# Patient Record
Sex: MALE | Race: WHITE
[De-identification: names, ages, dates, MRNs, and addresses within clinical notes are randomized per-mention and may not be internally consistent; named-entity substitution may affect disease eponyms.]

---

## 2018-03-26 ENCOUNTER — HOSPITAL ENCOUNTER (EMERGENCY)
Dept: HOSPITAL 92 - ERS | Age: 38
Discharge: HOME | End: 2018-03-26
Payer: OTHER GOVERNMENT

## 2018-03-26 DIAGNOSIS — J45.909: ICD-10-CM

## 2018-03-26 DIAGNOSIS — Z87.01: ICD-10-CM

## 2018-03-26 DIAGNOSIS — F31.9: ICD-10-CM

## 2018-03-26 DIAGNOSIS — F17.210: ICD-10-CM

## 2018-03-26 DIAGNOSIS — J20.9: Primary | ICD-10-CM

## 2018-03-26 DIAGNOSIS — F41.9: ICD-10-CM

## 2018-03-26 DIAGNOSIS — R51: ICD-10-CM

## 2018-03-26 LAB
ALBUMIN SERPL BCG-MCNC: 4.2 G/DL (ref 3.5–5)
ALP SERPL-CCNC: 75 U/L (ref 40–150)
ALT SERPL W P-5'-P-CCNC: 25 U/L (ref 8–55)
ANION GAP SERPL CALC-SCNC: 9 MMOL/L (ref 10–20)
AST SERPL-CCNC: 19 U/L (ref 5–34)
BASOPHILS # BLD AUTO: 0.1 THOU/UL (ref 0–0.2)
BASOPHILS NFR BLD AUTO: 0.7 % (ref 0–1)
BILIRUB SERPL-MCNC: 0.4 MG/DL (ref 0.2–1.2)
BUN SERPL-MCNC: 11 MG/DL (ref 8.9–20.6)
CALCIUM SERPL-MCNC: 9.7 MG/DL (ref 7.8–10.44)
CHLORIDE SERPL-SCNC: 102 MMOL/L (ref 98–107)
CK MB SERPL-MCNC: 2.1 NG/ML (ref 0–6.6)
CO2 SERPL-SCNC: 31 MMOL/L (ref 22–29)
CREAT CL PREDICTED SERPL C-G-VRATE: 0 ML/MIN (ref 70–130)
EOSINOPHIL # BLD AUTO: 0.2 THOU/UL (ref 0–0.7)
EOSINOPHIL NFR BLD AUTO: 2.8 % (ref 0–10)
GLOBULIN SER CALC-MCNC: 2.6 G/DL (ref 2.4–3.5)
GLUCOSE SERPL-MCNC: 88 MG/DL (ref 70–105)
HGB BLD-MCNC: 15.1 G/DL (ref 14–18)
LYMPHOCYTES # BLD: 1.4 THOU/UL (ref 1.2–3.4)
LYMPHOCYTES NFR BLD AUTO: 17.1 % (ref 21–51)
MAGNESIUM SERPL-MCNC: 2 MG/DL (ref 1.6–2.6)
MCH RBC QN AUTO: 32 PG (ref 27–31)
MCV RBC AUTO: 93.2 FL (ref 80–94)
MONOCYTES # BLD AUTO: 0.9 THOU/UL (ref 0.11–0.59)
MONOCYTES NFR BLD AUTO: 11 % (ref 0–10)
NEUTROPHILS # BLD AUTO: 5.6 THOU/UL (ref 1.4–6.5)
NEUTROPHILS NFR BLD AUTO: 68.4 % (ref 42–75)
PLATELET # BLD AUTO: 180 THOU/UL (ref 130–400)
POTASSIUM SERPL-SCNC: 3.9 MMOL/L (ref 3.5–5.1)
RBC # BLD AUTO: 4.72 MILL/UL (ref 4.7–6.1)
SODIUM SERPL-SCNC: 138 MMOL/L (ref 136–145)
TROPONIN I SERPL DL<=0.01 NG/ML-MCNC: (no result) NG/ML (ref ?–0.03)
WBC # BLD AUTO: 8.2 THOU/UL (ref 4.8–10.8)

## 2018-03-26 PROCEDURE — 84484 ASSAY OF TROPONIN QUANT: CPT

## 2018-03-26 PROCEDURE — 71046 X-RAY EXAM CHEST 2 VIEWS: CPT

## 2018-03-26 PROCEDURE — 96374 THER/PROPH/DIAG INJ IV PUSH: CPT

## 2018-03-26 PROCEDURE — 85025 COMPLETE CBC W/AUTO DIFF WBC: CPT

## 2018-03-26 PROCEDURE — 80053 COMPREHEN METABOLIC PANEL: CPT

## 2018-03-26 PROCEDURE — 83735 ASSAY OF MAGNESIUM: CPT

## 2018-03-26 PROCEDURE — 82553 CREATINE MB FRACTION: CPT

## 2018-03-26 PROCEDURE — 93005 ELECTROCARDIOGRAM TRACING: CPT

## 2018-03-26 PROCEDURE — 96375 TX/PRO/DX INJ NEW DRUG ADDON: CPT

## 2018-03-26 NOTE — RAD
CHEST TWO VIEWS:

 

HISTORY:

Cough.

 

COMPARISON:

03/28/2012

 

FINDINGS:

The cardiac silhouette and pulmonary vasculature are unremarkable.  The mediastinum is midline.  Lung
s remain hyperinflated with flattening of the hemidiaphragms.  No confluent air space consolidation, 
pneumothorax, or pleural fluid evident.

 

IMPRESSION:

Pulmonary hyperinflation is stable.  No new abnormalities are apparent.

 

POS: [facility]

## 2018-08-15 NOTE — RAD
THREE VIEWS OF THE LEFT HAND:

 

DATE: 8/15/18.

 

COMPARISON: 

None.

 

HISTORY: 

Bite to the left hand, trauma, injury, pain.

 

FINDINGS: 

Three views of the left hand demonstrate no displaced fracture or evidence of dislocation.  No radiop
aque foreign body or subcutaneous gas.

 

IMPRESSION: 

No acute osseous abnormality is seen.

 

POS: [facility]

## 2019-04-23 NOTE — RAD
SINGLE VIEW OF THE CHEST:

 

COMPARISON: 

7/6/2011.

 

HISTORY: 

Chest pain.

 

FINDINGS:

Single view of the chest shows a normal sized cardiomediastinal silhouette. There is no evidence of c
onsolidation, mass, or pleural effusion. The bones are unremarkable.

 

IMPRESSION:

No evidence of acute cardiopulmonary disease.

 

POS: [facility]

## 2019-09-13 NOTE — MRI
MRI LUMBAR SPINE NONCONTRAST:



HISTORY:

Status post laminectomy syndrome of the lumbar region.



COMPARISON:

Prior MRI performed in [place] is not available.



FINDINGS:

Appropriate T1 marrow signal intensity of the lumbar vertebrae.  Lumbar spine vertebral body height i
s maintained. No fracture. STIR hyperintensity to suggest vertebral body edema or ligamentous.



Appropriate signal intensity of the paraspinal muscles. Appropriate signal intensity of the solid org
ans.



Conus medullaris terminates at the inferior aspect of L1.



T12-L1:No significant central canal stenosis or significant neural foraminal narrowing.



L1-L2:Adequate disc hydration. Minimal narrowing of the left subarticular zone due to posterior eleme
nt hypertrophy and disc material. No significant obscuration the traversing left L2 nerve root.

Right subarticular zone is unremarkable. No significant stenosis of the thecal sac. Bilaterally, neur
al foramina are patent.



L2-L3:Adequate disc hydration. Generalized disc bulge, ligament flavum thickening and facet result mi
nimal central disc. Bilaterally, neural foramina pain.



L3-L4:Adequate disc hydration. No significant loss of disc space height. Generalized disc bulge, liga
ment flavum thickening and facet result in minimal central canal stenosis. Minimal flattening the

ventral thecal sac. Small amount of fluid in the right facet joint. Bilaterally, neural foramina are 
patent.



L4-L5:Desiccation with moderate loss of disc space height. Broad-based disc bulge, ligament flavum th
ickening and facet result in mild central canal stenosis. There is narrowing of both subarticular

zones with disc material abutting but does not obscuring the traversing right L5 nerve root. Partial 
obscuration the traversing left L5 nerve root.  There is a T2 hyperintensity along the left

ligament flavum measuring 0.5 cm. A small component of cystic degeneration is favored. Moderate right
 and mild-to-moderate left foraminal narrowing.



L5-S1:Adequate disc hydration. No significant canal stenosis. Neural foramina are patent.



IMPRESSION:

Degenerative disc disease at L4-L5 as described above. There is mild central canal stenosis. Narrowin
g of both subarticular zones, right greater than left. Partial obscuration of traversing left L5

nerve root.



Transcribed Date/Time: 9/13/2019 3:17 PM



Reported By: Malik Ndiaye 

Electronically Signed:  9/13/2019 3:56 PM

## 2019-09-13 NOTE — MRI
CERVICAL SPINE MRI NONCONTRAST:

 

INDICATION: 

Cervical radicular pain.

 

FINDINGS: 

Craniocervical junction is intact.  Imaged posterior fossa contents are unremarkable.  No significant
 central canal stenosis of C1-2.

 

C2-3:  There is a mild right asymmetric disk-osteophyte complex with slight narrowing of the central 
canal in the right paracentral zone and mild right ventral hemicord flattening.  No high-grade forami
nal stenosis.

 

C3-4:  Broad-based disk-osteophyte results in mild narrowing of the central canal.  There is bilatera
l uncinate process hypertrophy with mild left and minimal right neural foraminal narrowing.

 

C4-5:  There is a right asymmetric broad-based disk-osteophyte with mild narrowing of the central can
al and mild ventral cord effacement.  Mild to moderate right neural foraminal narrowing is present.  
There is no significant left foraminal stenosis.

 

C5-6:  Broad-based disk-osteophyte with mild narrowing of the central canal and mild ventral cord eff
acement.  There is mild right neural foraminal narrowing.  No significant left foraminal stenosis.

 

C6-7:  Right asymmetric broad-based disk-osteophyte with mild narrowing of the central canal.  There 
is moderate right and mild left neural foraminal narrowing.

 

C7-T1:  No high-grade central canal or neural foraminal stenosis.  There is no significant intrinsic 
cord signal abnormality or expansile process of the cervical spinal cord identified.

 

IMPRESSION: 

Multilevel mild degenerative disk changes which result in central canal stenosis, mild ventral cord e
ffacement, and multilevel neural foraminal stenosis as outlined above.

 

POS: [facility]

## 2019-09-13 NOTE — RAD
LUMBAR SPINE 2 TO 3 VIEWS:

 

INDICATION: 

Post laminectomy syndrome of the lumbar region.  

 

FINDINGS: 

There is mild end plate degenerative change at multiple levels of the lumbar spine without compressio
n fracture or significant subluxation.  Mild facet sclerosis at multiple levels is present.  There is
 slight narrowing of the L4-5 disk space.

 

IMPRESSION: 

Mild degenerative change of the lumbar spine, without acute compression fracture or subluxation.

 

POS: [facility]

## 2020-06-06 NOTE — MRI
MRI LUMBAR SPINE WITHOUT CONTRAST:

6/6/20

 

COMPARISON: 

None.

 

HISTORY: 

Injury, trauma, pain.

 

TECHNIQUE:   

Multiplanar and multisequence MR imaging of the lumbar spine provided without contrast. 

 

FINDINGS: 

The sagittal STIR imaging demonstrates no focal area of osseous marrow edema. There is mild diffuse c
entral canal stenosis on the basis of congenitally short pedicles. On the basis of five lumbar type v
ertebral bodies, the conus medullaris terminates at the L1-2 level. 

 

T12-L1: Disc space narrowing and disc desiccation. Mild bilateral facet hypertrophy. Mild central can
al stenosis. No neural foraminal stenosis. 

 

L1-2: Mild facet hypertrophy. Disc desiccation. No significant central canal or neural foraminal sten
osis. 

 

L2-3: No significant central canal or neural foraminal stenosis. 

 

L3-4: Intervertebral disc height and signal intensity appears within normal limits with no significan
t central canal or neural foraminal stenosis. 

 

L4-5: There is disc space narrowing with disc desiccation and mild disc bulge causing a mild degree o
f central canal stenosis. Mild bilateral facet hypertrophy noted with no significant neural foraminal
 stenosis. 

 

L5-S1: Intervertebral disc height and signal intensity appears grossly unremarkable. Mild bilateral f
acet hypertrophy. No significant central canal or neural foraminal stenosis. Evaluation is somewhat l
imited with respect to evaluation for central canal and neural foraminal stenosis secondary to patien
t motion artifact. Imaged retroperitoneal structures appear grossly unremarkable. 

 

IMPRESSION: 

Scattered degenerative changes. No severe central canal or neural foraminal  stenosis. Please see abo
ve discussion. 

 

POS: [facility]

## 2020-06-06 NOTE — RAD
LUMBAR SPINE THREE VIEWS:

6/6/20

 

COMPARISON: 

None.

 

HISTORY: 

Tailbone pain for one week after a fall. 

 

FINDINGS: 

Five views of the lumbar spine demonstrate intact pedicles with no anterolisthesis or retrolisthesis.
 No displaced fracture. 

 

IMPRESSION: 

No displaced fracture of the lumbar spine. 

 

POS: [facility]

## 2020-06-06 NOTE — RAD
THREE VIEWS OF THE SACRUM/COCCYX

6/6/20

 

COMPARISON: 

None.

 

HISTORY: 

Tailbone pain following a fall. 

 

FINDINGS: 

The pelvic ring appears intact. Mild degenerative changes of the pubic symphysis. No widening or the 
sacroiliac joints of the pubic symphysis. No displaced fracture or dislocation.

 

IMPRESSION: 

No acute osseous abnormality. 

 

POS: [facility]